# Patient Record
Sex: FEMALE
[De-identification: names, ages, dates, MRNs, and addresses within clinical notes are randomized per-mention and may not be internally consistent; named-entity substitution may affect disease eponyms.]

---

## 2018-01-09 ENCOUNTER — HOSPITAL ENCOUNTER (OUTPATIENT)
Dept: HOSPITAL 92 - BICMAMMO | Age: 43
Discharge: HOME | End: 2018-01-09
Attending: OBSTETRICS & GYNECOLOGY
Payer: COMMERCIAL

## 2018-01-09 DIAGNOSIS — Z12.31: Primary | ICD-10-CM

## 2018-01-09 PROCEDURE — 77067 SCR MAMMO BI INCL CAD: CPT

## 2019-01-06 ENCOUNTER — HOSPITAL ENCOUNTER (OUTPATIENT)
Dept: HOSPITAL 92 - ERS | Age: 44
Setting detail: OBSERVATION
LOS: 2 days | Discharge: HOME | End: 2019-01-08
Attending: SURGERY | Admitting: SURGERY
Payer: COMMERCIAL

## 2019-01-06 VITALS — BODY MASS INDEX: 34.7 KG/M2

## 2019-01-06 DIAGNOSIS — Z90.722: ICD-10-CM

## 2019-01-06 DIAGNOSIS — K81.1: Primary | ICD-10-CM

## 2019-01-06 DIAGNOSIS — Z90.710: ICD-10-CM

## 2019-01-06 LAB
ALBUMIN SERPL BCG-MCNC: 3.9 G/DL (ref 3.5–5)
ALP SERPL-CCNC: 102 U/L (ref 40–150)
ALT SERPL W P-5'-P-CCNC: 37 U/L (ref 8–55)
ANION GAP SERPL CALC-SCNC: 13 MMOL/L (ref 10–20)
AST SERPL-CCNC: 18 U/L (ref 5–34)
BASOPHILS # BLD AUTO: 0.1 THOU/UL (ref 0–0.2)
BASOPHILS NFR BLD AUTO: 0.4 % (ref 0–1)
BILIRUB SERPL-MCNC: 0.6 MG/DL (ref 0.2–1.2)
BUN SERPL-MCNC: 12 MG/DL (ref 7–18.7)
CALCIUM SERPL-MCNC: 9.2 MG/DL (ref 7.8–10.44)
CHLORIDE SERPL-SCNC: 105 MMOL/L (ref 98–107)
CO2 SERPL-SCNC: 25 MMOL/L (ref 22–29)
CREAT CL PREDICTED SERPL C-G-VRATE: 0 ML/MIN (ref 70–130)
CRYSTAL-AUWI FLAG: 0 (ref 0–15)
EOSINOPHIL # BLD AUTO: 0.1 THOU/UL (ref 0–0.7)
EOSINOPHIL NFR BLD AUTO: 0.9 % (ref 0–10)
GLOBULIN SER CALC-MCNC: 3.9 G/DL (ref 2.4–3.5)
GLUCOSE SERPL-MCNC: 105 MG/DL (ref 70–105)
HEV IGM SER QL: 5.9 (ref 0–7.99)
HGB BLD-MCNC: 14.7 G/DL (ref 12–16)
HYALINE CASTS #/AREA URNS LPF: (no result) LPF
LIPASE SERPL-CCNC: 126 U/L (ref 8–78)
LYMPHOCYTES # BLD: 3.6 THOU/UL (ref 1.2–3.4)
LYMPHOCYTES NFR BLD AUTO: 27.2 % (ref 21–51)
MCH RBC QN AUTO: 28.7 PG (ref 27–31)
MCV RBC AUTO: 86.5 FL (ref 78–98)
MONOCYTES # BLD AUTO: 0.9 THOU/UL (ref 0.11–0.59)
MONOCYTES NFR BLD AUTO: 6.9 % (ref 0–10)
NEUTROPHILS # BLD AUTO: 8.7 THOU/UL (ref 1.4–6.5)
NEUTROPHILS NFR BLD AUTO: 64.6 % (ref 42–75)
PATHC CAST-AUWI FLAG: 1.88 (ref 0–2.49)
PLATELET # BLD AUTO: 273 THOU/UL (ref 130–400)
POTASSIUM SERPL-SCNC: 3.6 MMOL/L (ref 3.5–5.1)
PROT UR STRIP.AUTO-MCNC: 30 MG/DL
RBC # BLD AUTO: 5.1 MILL/UL (ref 4.2–5.4)
RBC UR QL AUTO: (no result) HPF (ref 0–3)
SODIUM SERPL-SCNC: 139 MMOL/L (ref 136–145)
SP GR UR STRIP: 1.02 (ref 1–1.04)
SPERM-AUWI FLAG: 0 (ref 0–9.9)
WBC # BLD AUTO: 13.4 THOU/UL (ref 4.8–10.8)
WBC UR QL AUTO: (no result) HPF (ref 0–3)
YEAST-AUWI FLAG: 0 (ref 0–25)

## 2019-01-06 PROCEDURE — 96376 TX/PRO/DX INJ SAME DRUG ADON: CPT

## 2019-01-06 PROCEDURE — 88304 TISSUE EXAM BY PATHOLOGIST: CPT

## 2019-01-06 PROCEDURE — BF10YZZ FLUOROSCOPY OF BILE DUCTS USING OTHER CONTRAST: ICD-10-PCS | Performed by: SURGERY

## 2019-01-06 PROCEDURE — A9537 TC99M MEBROFENIN: HCPCS

## 2019-01-06 PROCEDURE — 96374 THER/PROPH/DIAG INJ IV PUSH: CPT

## 2019-01-06 PROCEDURE — 76705 ECHO EXAM OF ABDOMEN: CPT

## 2019-01-06 PROCEDURE — 81003 URINALYSIS AUTO W/O SCOPE: CPT

## 2019-01-06 PROCEDURE — 96365 THER/PROPH/DIAG IV INF INIT: CPT

## 2019-01-06 PROCEDURE — 83690 ASSAY OF LIPASE: CPT

## 2019-01-06 PROCEDURE — 80076 HEPATIC FUNCTION PANEL: CPT

## 2019-01-06 PROCEDURE — 80048 BASIC METABOLIC PNL TOTAL CA: CPT

## 2019-01-06 PROCEDURE — 0FT44ZZ RESECTION OF GALLBLADDER, PERCUTANEOUS ENDOSCOPIC APPROACH: ICD-10-PCS | Performed by: SURGERY

## 2019-01-06 PROCEDURE — 85025 COMPLETE CBC W/AUTO DIFF WBC: CPT

## 2019-01-06 PROCEDURE — 36415 COLL VENOUS BLD VENIPUNCTURE: CPT

## 2019-01-06 PROCEDURE — 78226 HEPATOBILIARY SYSTEM IMAGING: CPT

## 2019-01-06 PROCEDURE — G0378 HOSPITAL OBSERVATION PER HR: HCPCS

## 2019-01-06 PROCEDURE — S0028 INJECTION, FAMOTIDINE, 20 MG: HCPCS

## 2019-01-06 PROCEDURE — 80053 COMPREHEN METABOLIC PANEL: CPT

## 2019-01-06 PROCEDURE — 96375 TX/PRO/DX INJ NEW DRUG ADDON: CPT

## 2019-01-06 PROCEDURE — 96366 THER/PROPH/DIAG IV INF ADDON: CPT

## 2019-01-06 PROCEDURE — 47532 INJECTION FOR CHOLANGIOGRAM: CPT

## 2019-01-06 PROCEDURE — 81015 MICROSCOPIC EXAM OF URINE: CPT

## 2019-01-06 RX ADMIN — Medication SCH ML: at 20:35

## 2019-01-06 NOTE — ULT
PRELIMINARY REPORT/[facility]/EMERGENTY AFTER-HOURS PROCEDURE 

 

US Abdomen Limited, Right Upper Quadrant

 

EXAM DATE/TIME:

1/6/2019 2:29 AM

 

CLINICAL HISTORY:

43 years old, female; Pain; Other: Ruq pain, HX of gallstones

 

TECHNIQUE:

Real-time ultrasound of the abdomen with image documentation. Examination was focused on the right up
per quadrant.

 

COMPARISON:

No relevant prior studies available.

 

FINDINGS:

Liver: Liver demonstrates fatty infiltration. Hypoechoic 3.5 x 3.2 x 4.6 cm focus noted left lobe whi
ch may reflect area of fatty sparing however mass not excluded.

Gallbladder: Multiple stones with sludge, wall thickening at 3 mm, and focal sonographic tenderness.

Common bile duct: 5 mm. No stones. No dilation.

Pancreas: Obscured.

Right kidney: Normal. No mass. No hydronephrosis.

 

IMPRESSION:

Gallstones with sludge, wall thickening and focal sonographic tenderness. Findings consistent with ch
olecystitis in the appropriate clinical setting.

Fatty liver.

Hypoechoic focus noted left lobe which may reflect area of fatty sparing however mass not excluded.

Nonemergent MRI vs CT liver correlation advised.

 

Thank you for allowing us to participate in the care of your patient.

Dictated and Authenticated by: Catherine Hill MD

01/06/2019 5:06 AM Central Time ([place] & [place])

 

FINAL REPORT

ULTRASOUND GALLBLADDER RIGHT UPPER QUADRANT:

 

History: Pain. 

 

Comparison: None. 

 

FINDINGS: 

The findings and impression are concordant with the preliminary report. Code QA. Liver protocol CT or
 MRI is highly recommended. 

 

POS: [facility]

## 2019-01-06 NOTE — RAD
X-RAY CHOLANGIOGRAM IN SURGERY:

 

HISTORY:

Laparoscopic cholecystectomy with cholangiogram.

 

COMPARISON:

None.

 

FINDINGS:

The cystic duct is cannulated.  Contrast is seen in the cystic duct and the common bile duct, as well
 as the pancreatic duct.  There is small volume contrast within the proximal small bowel.

 

IMPRESSION:

Fluoroscopy for surgical use.

 

POS: HOME

## 2019-01-06 NOTE — HP
HISTORY OF PRESENT ILLNESS:  Ms. Nogueira is a 43-year-old  woman, who was

visiting family in [place].  The patient reports insidious onset of epigastric

abdominal pain, which was postprandial in nature while in [place].  The patient's

pain started 4 days ago, associated with abdominal bloating and flatulence. 



Pain was rated at 10/10. 



The patient also endorsed some nausea and nonbilious emesis. 



Denies any fevers or chills. 



She was seen in [place] by primary physician and received antibiotics and IV fluids

overnight with minimum relief the next day. 



This patient was unable to retain some of the oral medications that was given to her

the next day. 



They decided to return back to [place]. 



En route, the patient had a recurrent epigastric abdominal pain when she took a bite

of subway sandwich. 



She then presented to the emergency department yesterday with a complaint of

worsening abdominal pain. 



She denies any fevers or chills.



PAST MEDICAL HISTORY:  Denies any previous medical problems except for some ovarian

neoplastic process.  She is unsure as to if there were malignancies. 



PAST SURGICAL HISTORY:  Pertinent now for total abdominal hysterectomy with

bilateral salpingo-oophorectomy. 



SOCIAL HISTORY:  The patient is  and lives at home with her family. 



She is a G2, P2. 



She is employed as a  at a high school. 



She denies any cigarette smoking, ethanol, or illicit drug abuse.



FAMILY HISTORY:  Notable for her father with essential hypertension and diabetes

mellitus.  She denies any family history of heart disease or cancer. 



PRE-HOSPITAL MEDICATIONS:  She denies any medications except for over-the-counter

multivitamins. 



ALLERGIES:  THE PATIENT DENIES ANY KNOWN DRUG ALLERGIES.



REVIEW OF SYSTEMS:  Ten-point review of systems essentially unremarkable except as

stated in past medical history and chief complaint. 



PHYSICAL EXAMINATION:

GENERAL:  This reveals a 43-year-old normally-developed woman, who is otherwise

coherent and interactive and appears stated age.  The patient is alert and oriented

x3, appears to be in no acute distress at the time of my evaluation.  She now rates

the pain at 4/10. 

VITAL SIGNS:  This morning, blood pressure 105/67, pulse 77, respiratory rate is 15,

temperature 98.4 degrees Fahrenheit, and oxygen saturation 95% on room air. 

HEENT:  Reveals normocephalic and atraumatic.  Pupils are equal, round, and reactive

to light and accommodation.  Extraocular muscles are intact bilaterally.  She has no

sclerae icterus present.  Oral mucosa pink and moist.  No lesions are noted. 

HEART:  Reveals regular rate and rhythm.  No murmurs or gallops auscultated. 

LUNGS:  Clear to auscultation bilaterally.  Her breathing regular and nonlabored. 

ABDOMEN:  Soft and obese.  She has right upper quadrant tenderness to palpation with

a positive Ramos's sign.  Liver and spleen otherwise nonpalpable below costal

margin. 

EXTREMITIES:  Reveal 2+ radial and pedal pulses bilaterally.  She has no ankle edema

present. 

NEUROLOGIC:  Reveals no focal deficits present.



LABORATORY FINDINGS:  Today includes a CBC with 13,400 white blood cells, hemoglobin

and hematocrit 14.7 and 44.1 respectively, and platelet count is 273,000. 



Metabolic profile; sodium 139, potassium 3.6, chloride is 105, bicarb is 25, BUN 12,

creatinine 0.79, and glucose 105.  Total bilirubin 0.6, AST and ALT normal at 18 and

37 respectively, and alkaline phosphatase is also normal at 102.  Serum lipase is

marginally elevated at 126. 



I have personally reviewed the abdominal ultrasound, which is remarkable for

multiple intraluminal gallstones. 



There is gallbladder wall thickening and pericholecystic fluid present. 



Common bile duct is normal in diameter for this patient's age at 5.1 mm in diameter.



IMPRESSION:  Acute cholecystitis with cholelithiasis.



PLAN:  

1. Laparoscopic cholecystectomy.

2. I advised the patient of the above findings and plan.

3. I also informed the patient of the risks and benefits of the proposed surgery to

include, but not limited to, bleeding, infection, injury to bile duct or surrounding

structures. 

4. This information was provided to the patient through a .  The

patient indicates understanding information given. 

5. She is going to consent for this admission and surgical intervention.







Job ID:  [id number]

## 2019-01-07 LAB
ALBUMIN SERPL BCG-MCNC: 3.3 G/DL (ref 3.5–5)
ALP SERPL-CCNC: 196 U/L (ref 40–150)
ALT SERPL W P-5'-P-CCNC: 153 U/L (ref 8–55)
ANION GAP SERPL CALC-SCNC: 13 MMOL/L (ref 10–20)
AST SERPL-CCNC: 159 U/L (ref 5–34)
BASOPHILS # BLD AUTO: 0 THOU/UL (ref 0–0.2)
BASOPHILS NFR BLD AUTO: 0.2 % (ref 0–1)
BILIRUB DIRECT SERPL-MCNC: 1.7 MG/DL (ref 0.1–0.3)
BILIRUB SERPL-MCNC: 2.2 MG/DL (ref 0.2–1.2)
BUN SERPL-MCNC: 8 MG/DL (ref 7–18.7)
CALCIUM SERPL-MCNC: 8.7 MG/DL (ref 7.8–10.44)
CHLORIDE SERPL-SCNC: 104 MMOL/L (ref 98–107)
CO2 SERPL-SCNC: 25 MMOL/L (ref 22–29)
CREAT CL PREDICTED SERPL C-G-VRATE: 158 ML/MIN (ref 70–130)
EOSINOPHIL # BLD AUTO: 0.1 THOU/UL (ref 0–0.7)
EOSINOPHIL NFR BLD AUTO: 0.7 % (ref 0–10)
GLUCOSE SERPL-MCNC: 114 MG/DL (ref 70–105)
HGB BLD-MCNC: 12.6 G/DL (ref 12–16)
LIPASE SERPL-CCNC: 1790 U/L (ref 8–78)
LYMPHOCYTES # BLD: 2.5 THOU/UL (ref 1.2–3.4)
LYMPHOCYTES NFR BLD AUTO: 23.7 % (ref 21–51)
MCH RBC QN AUTO: 28.8 PG (ref 27–31)
MCV RBC AUTO: 86.5 FL (ref 78–98)
MONOCYTES # BLD AUTO: 0.7 THOU/UL (ref 0.11–0.59)
MONOCYTES NFR BLD AUTO: 6.6 % (ref 0–10)
NEUTROPHILS # BLD AUTO: 7.1 THOU/UL (ref 1.4–6.5)
NEUTROPHILS NFR BLD AUTO: 68.8 % (ref 42–75)
PLATELET # BLD AUTO: 245 THOU/UL (ref 130–400)
POTASSIUM SERPL-SCNC: 3.5 MMOL/L (ref 3.5–5.1)
RBC # BLD AUTO: 4.39 MILL/UL (ref 4.2–5.4)
SODIUM SERPL-SCNC: 138 MMOL/L (ref 136–145)
WBC # BLD AUTO: 10.4 THOU/UL (ref 4.8–10.8)

## 2019-01-07 RX ADMIN — Medication SCH ML: at 09:11

## 2019-01-07 RX ADMIN — Medication SCH ML: at 21:14

## 2019-01-07 NOTE — NM
HEPATOBILIARY SCAN:

 

COMPARISON: 

Cholangiogram in surgery 1/6/2019 and gallbladder ultrasound 1/6/2019.

 

HISTORY: 

Evaluate for common bile duct obstruction.  Elevated bilirubin status post cholecystectomy.

 

TECHNIQUE: 

A hepatobiliary scan was performed after administration of 5.1 mCi of Technetium 99m mebrofenin.

 

FINDINGS:  

Prompt uptake of the radiopharmaceutical by the liver is seen.  the gallbladder is not seen as it has
 been removed.  Contrast is seen within the bowel by 10 minutes.

 

IMPRESSION: 

No evidence of common bile duct obstruction as the radiopharmaceutical passes into the bowel.

 

POS: [facility]

## 2019-01-07 NOTE — OP
DATE OF PROCEDURE:  01/06/2019



PREOPERATIVE DIAGNOSIS:  Acute cholecystitis with cholelithiasis.



POSTOPERATIVE DIAGNOSES:  Acute cholecystitis, cholelithiasis, and

choledocholithiasis. 



OPERATION PERFORMED:  Laparoscopic cholecystectomy with intraoperative cholangiogram.



ANESTHESIA:  General endotracheal.



ESTIMATED BLOOD LOSS:  25 mL.



FLUIDS GIVEN:  1500 mL crystalloids.



COUNTS:  Sponge and instrument count was verified as correct x2.



COMPLICATIONS:  None apparent at the time of operation.



INDICATIONS FOR OPERATION:  A 43-year-old  woman, who presented with

recurrent epigastric right upper quadrant abdominal pain. 



Clinical radiographic examination was consistent with acute cholecystitis with

cholelithiasis for which the patient was brought to the operative room for

cholecystectomy. 



Intraoperative cholangiogram was also performed as the patient was noted with mildly

elevated lipase. 



The common bile duct was also upper limits of normal for patient's age. 



Findings are consistent with gallbladder in the usual anatomic location, completely

encased by omental adhesions. 



Cholangiography also reveals distal common bile duct with filling defects suggestive

of choledocholithiasis. 



DESCRIPTION OF PROCEDURE:  Informed consent was obtained from the patient, who was

brought to the operating room and placed in supine position. 



Following general anesthesia, abdomen was sterilely prepped and draped in the usual

fashion.  The skin below the umbilicus was infiltrated with 0.25% Marcaine with

epinephrine. 



A small curvilinear infraumbilical incision was made using 11 scalpel. 



The umbilical stalk was grasped with a Kocher and elevated. 



Veress needle was inserted through incision first in the peritoneal cavity through

which the abdomen was insufflated with 3 L of CO2 gas. 



The intraabdominal pressure was noted at 2 mmHg present for abdomen insufflation.

The Veress needle was removed and a 5 mm trocar was introduced using a Visiport

under laparoscopy. 



Laparoscopy confirmed proper placement of the port with no injury to the underlying

structures. 



Additional laparoscopy revealed gallbladder in the usual anatomic location,

completely encased by omental adhesions. 



Under direct laparoscopy, a 12 mm epigastric and two 5 mm right lateral subcostal

ports were placed.  The overlying skin was infiltrated with 0.25% Marcaine with

epinephrine and appropriate incision was made. 



The patient was placed in a reverse Trendelenburg position and rotated to the left. 



I introduced a Dayna dissector with cautery using this to take down omental

adhesions to expose the fundus of the gallbladder. 



A Prestige grasper was introduced through the right lateral subcostal port, grasping

the fundus of gallbladder, which was elevated cephalad. 



The omental adhesions were then taken down from the remainder of the gallbladder

using a Dayna dissector with cautery.  Good hemostasis was noted in place. 



Anterior coursing cystic artery was dissected free from surrounding structures and

divided between clips, applying two clips proximally and one clip at the junction of

the cystic artery and gallbladder. 



The cystic duct was also carefully dissected free from surrounding structures. 



I placed a securing clip at the junction of the cystic duct and gallbladder. 



Cystotomy was made proximal to securing clip using Endo scissors. 



To introduce the cholangiocatheter in the right upper quadrant, we flushed first

with saline and the catheter was introduced into the cystic duct for lumen, securing

this with a single clip. 



It was flushed with difficulty with saline and then on direct fluoroscopy

cholangiogram was completed using a total of 23 mL of Conray contrast. 



Total fluoroscopy time was 56 seconds. 



There was a filling defect in the distal aspect of the common bile duct. 



I gave a milligram of glucagon and repeated the cholangiogram, still persistent

distal common bile duct.  No filling defect was noted. 



Cholangiography was terminated at this juncture. 



Securing clip was removed and the cystic duct was divided between clips, applying

two clips proximally. 



The gallbladder itself was removed from the liver bed using cautery with good

hemostasis. 



Gallbladder was delivered out of the abdominal cavity using the EndoCatch. 



Operative site was irrigated with saline. 



Finding no other pathology, laparoscopy was terminated.  The fascia of the

epigastric port was closed using 0 Vicryl suture and endo-closure on the

laparoscopy. 



Abdomen was desufflated. 



All ports and instruments were removed and accounted for. 



Skin incision was closed using 4-0 Monocryl suture in subcuticular fashion. 



Dermabond was applied over incisional closure. 



The patient tolerated the operation without any apparent complication and was

returned to recovery room in satisfactory condition. 







Job ID:  [id number]

## 2019-01-07 NOTE — PRG
DATE OF SERVICE:  01/07/2019



SUBJECTIVE:  The patient is postop day #1 cholecystectomy.  The patient had no

overnight event.  She is n.p.o. at this time.  GI was consulted and a HIDA scan was

ordered. 



OBJECTIVE:  VITAL SIGNS:  Temperature 98.4, pulse 73, respirations 16, 93% on room

air SpO2, 130/80 blood pressure. 

GENERAL:  The patient is awake, alert, in no distress, lying in bed. 

HEENT:  Normocephalic and atraumatic.  Mucous membranes moist. 

RESPIRATORY:  Rate regular, unlabored.  No distress. 

CARDIOVASCULAR:  No pedal edema.  Regular rhythm. 

MUSCULOSKELETAL:  Moves all extremities.  Positive distal pulses.



ASSESSMENT AND PLAN:  Postop day 1 of cholecystectomy.  Pending GI consult and HIDA

scan, possible endoscopic retrograde cholangiopancreatography.  We will repeat LFTs

in the morning.  The patient has been discussed with the attending surgeon. 







Job ID:  [id number]

## 2019-01-07 NOTE — CON
DATE OF CONSULTATION:  



HISTORY OF PRESENT ILLNESS:  The patient is a 43-year-old  female, who came

into the hospital yesterday with 4 days of right upper quadrant pain.  She

subsequently underwent a cholecystectomy and intraoperative cholangiogram.  The

intraoperative cholangiogram showed some sparse bowel filling, but no clear

obstruction.  I reviewed the cholangiogram with the radiologist and it was unclear

whether there was a distinct gallstone in the common bile duct.  Presently, the

patient is very hungry.  She is having no abdominal pain.  She is sore from her

surgery, but otherwise doing well. 



PAST MEDICAL HISTORY:  Essentially negative.



PAST SURGICAL HISTORY:  Includes hysterectomy.



SOCIAL HISTORY:  She does not smoke or drink.



FAMILY HISTORY:  Negative for GI or liver disease.



REVIEW OF SYSTEMS:  CONSTITUTIONAL:  No fever or chills.  No weight loss. 

EYES:  No blurred vision or double vision. 

ENT:  No sore throat or earaches. 

CARDIOVASCULAR:  No chest pain or palpitation. 

PULMONARY:  No shortness of breath, cough, or wheezing. 

GI:  See above. 

:  No hematuria or dysuria. 

MUSCULOSKELETAL:  No joint pain or muscle weakness. 

SKIN:  No rashes. 

NEUROLOGIC:  No numbness or seizure activity.



PHYSICAL EXAMINATION:

GENERAL:  Shows overweight  female, in no acute distress. 

VITAL SIGNS:  Temperature 98.2, pulse 79, respiratory rate 16, and blood pressure

132/83. 

HEENT:  Unremarkable. 

NECK:  Supple. 

CHEST:  Clear. 

CARDIOVASCULAR:  Regular rate and rhythm. 

ABDOMEN:  Soft, slightly tender in the right upper quadrant without rebound or

guarding.  Bowel sounds are present normoactive. 



LABORATORY DATA:  Laboratories shows essentially normal CBC.  Chemistries were

normal on admission with a lipase of 126.  Today's labs showed a total bilirubin

2.2, direct is 1.7, , , alkaline phosphatase of 196, and lipase is

1790.  HIDA scan showed no evidence of common bile duct obstruction. 



ASSESSMENT:  Abnormal liver function test, status post laparoscopic cholecystectomy

- it is unclear in this situation whether the patient has a common duct stone.  The

common bile duct is very small.  There are no clear filling defects.  She does have

some slight elevation of her lipase, either from the cholangiogram or possibly a

small stone.  Presently, she is feeling well without pain or decreased appetite. 



RECOMMENDATIONS:  Repeat LFTs in a.m.  If elevated, then would proceed with ERCP.

If improving, would feed the patient and discharge her if she tolerates her diet. 







Job ID:  [id number]

## 2019-01-08 VITALS — TEMPERATURE: 98.4 F

## 2019-01-08 VITALS — DIASTOLIC BLOOD PRESSURE: 70 MMHG | SYSTOLIC BLOOD PRESSURE: 109 MMHG

## 2019-01-08 LAB
ALBUMIN SERPL BCG-MCNC: 3.3 G/DL (ref 3.5–5)
ALP SERPL-CCNC: 180 U/L (ref 40–150)
ALT SERPL W P-5'-P-CCNC: 126 U/L (ref 8–55)
AST SERPL-CCNC: 81 U/L (ref 5–34)
BILIRUB DIRECT SERPL-MCNC: 0.4 MG/DL (ref 0.1–0.3)
BILIRUB SERPL-MCNC: 0.7 MG/DL (ref 0.2–1.2)
LIPASE SERPL-CCNC: 116 U/L (ref 8–78)

## 2019-01-08 RX ADMIN — Medication SCH ML: at 07:35

## 2019-01-09 NOTE — DIS
DATE OF ADMISSION:  01/06/2019



DATE OF DISCHARGE:  01/08/2019



ADMITTING PHYSICIAN:  Rohan Moore DO



DISCHARGING PHYSICIAN:  Rohan Moore DO.



ADMITTING DIAGNOSIS:  Acute cholecystitis with cholelithiasis.



POSTOPERATIVE DIAGNOSES:  

1. Acute cholecystitis with cholelithiasis.

2. Choledocholithiasis, resolved.

3. Resolved acute pancreatitis.



CONSULTANTS:  Dr. Tej Polk with Gastroenterology.



OPERATIONS AND PROCEDURES:  Laparoscopic cholecystectomy with intraoperative

cholangiogram on 01/06/2018, by Dr. Moore.  Please see a separate dictation for

operative report. 



HISTORY AND HOSPITAL COURSE:  A 43-year-old woman, presented with recurrent

epigastric right upper quadrant abdominal pain. 



Clinical and radiographic examination were consistent with acute cholecystitis with

cholelithiasis for which the patient was brought to the operating room for

laparoscopic cholecystectomy. 



Given marginally elevated lipases, decision was made to perform intraoperative

cholangiogram. 



Cholangiogram revealed filling defects involving the distal common bile duct. 



Choledocholithiasis was suspected for which Gastroenterology was consulted. 



Postoperative day #1, the patient was evaluated, although her pain is markedly

improved.  Her LFTs and serum lipase were markedly elevated. 



The patient was placed on a clear liquid diet. 



HIDA scan was obtained which did not reveal any evidence of common bile ductal

obstruction. 



Postoperative day #2, the patient is re-evaluated at this time.  She reports no

abdominal pain.  She is tolerating diet, having normal bowel and urinary function. 



LFTs and serum lipase done, almost normalized. 



She has remained hemodynamically stable and afebrile throughout this

hospitalization. 



On clinical examination, abdomen is soft with mild incisional tenderness to

palpation. 



I suspect that the patient may have passed a stone and pancreatitis has clinically

resolved. 



Diet was advanced and the patient has been discharged today with the following

instructions; 

1. She follows up with me in the Surgery Clinic in 2 weeks.

2. She is instructed to avoid weight lifting in excess of 20 pounds __________ has

been released by me. 

3. She may shower, but avoid soaking herself in a bathtub or swim until I have seen

her in the next 2 weeks. 

4. She is instructed to resume all of her pre-hospital medications as prescribed by

her primary care physician. 

5. Additionally, she may take Tylenol 1000 mg p.o. q.6 hours alternating this with

ibuprofen 800 mg p.o. q.8 hours p.r.n. pain. 

6. Additionally, she is given a prescription for tramadol 50 mg, #30, to be taken 1

to 2 p.o. q.6 hours p.r.n. breakthrough pain. 

7. The patient is instructed to call me with any questions or problems including

exacerbation of abdominal pain, fever in excess of 101 degrees Fahrenheit or

intolerance to oral intake. 

Information given to the patient in the presence of her daughter who provided

[place] interpretation. 



The patient has indicated understanding of information given. 



She has expressed gratitude for the care and __________ during this hospitalization

and surgery. 







Job ID:  [id number]

## 2019-09-26 ENCOUNTER — HOSPITAL ENCOUNTER (EMERGENCY)
Dept: HOSPITAL 9 - MADERS | Age: 44
Discharge: HOME | End: 2019-09-26
Payer: COMMERCIAL

## 2019-09-26 DIAGNOSIS — S92.141A: Primary | ICD-10-CM
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
## 2019-09-26 PROCEDURE — 29515 APPLICATION SHORT LEG SPLINT: CPT

## 2019-09-26 NOTE — RAD
THREE VIEWS RIGHT ANKLE:

 

COMPARISON: 

None.

 

HISTORY: 

Right ankle pain after twisting it earlier this morning.

 

FINDINGS: 

Three views of the right ankle show a small avulsion fracture off the lateral aspect of the talus.  N
o tibial fracture or fibula fracture is seen.  Moderate lateral soft tissue swelling is seen.  

 

IMPRESSION: 

Small avulsion fracture off the lateral talus.

 

POS: [facility]

## 2020-07-07 ENCOUNTER — HOSPITAL ENCOUNTER (OUTPATIENT)
Dept: HOSPITAL 92 - BICMAMMO | Age: 45
Discharge: HOME | End: 2020-07-07
Attending: OBSTETRICS & GYNECOLOGY
Payer: COMMERCIAL

## 2020-07-07 DIAGNOSIS — N64.4: Primary | ICD-10-CM

## 2020-07-07 PROCEDURE — G0279 TOMOSYNTHESIS, MAMMO: HCPCS

## 2020-07-07 PROCEDURE — 77066 DX MAMMO INCL CAD BI: CPT

## 2020-07-07 NOTE — MMO
Bilateral MAMMO Bilat Diag [facility].

 

CLINICAL HISTORY:

Patient is 44 years old and is seen for diagnostic exam and pain in the left

breast. The patient has no family history of breast cancer.  The patient has no

personal history of cancer.

 

VIEWS:

The views performed were:  bilateral craniocaudal with tomosynthesis; bilateral

mediolateral oblique with tomosynthesis; bilateral mediolateral with

tomosynthesis; and  cleavage view.

 

FILMS COMPARED:

The present examination has been compared to prior imaging studies performed at

[facility] on 03/11/2019, and at [facility] on 05/06/2013,

03/24/2016 and 01/09/2018.

 

This study has been interpreted with the assistance of computer-aided detection.

 

MAMMOGRAM FINDINGS:

There are scattered fibroglandular densities.

 

There are no suspicious masses, suspicious calcifications, or new areas of

architectural distortion.

 

There are no mammographic abnormalities to explain the patient's breast pain.

The patient is referred back to her clinician.  Negative imaging findings should

not preclude biopsy if clinical findings are suspicious.

 

IMPRESSION:

THERE ARE NO MAMMOGRAPHIC ABNORMALITIES TO EXPLAIN THE PATIENT'S BREAST PAIN.

THE PATIENT IS REFERRED BACK TO HER CLINICIAN. NEGATIVE IMAGING FINDINGS SHOULD

NOT PRECLUDE BIOPSY IF CLINICAL FINDINGS ARE SUSPICIOUS.

 

THE RESULTS OF THIS EXAM WERE SENT TO THE PATIENT.

 

ACR BI-RADS Category 1 - Negative

 

MAMMOGRAPHY NOTE:

 1. A negative mammogram report should not delay a biopsy if a dominant of

 clinically suspicious mass is present.

 2. Approximately 10% to 15% of breast cancers are not detected by

 mammography.

 3. Adenosis and dense breasts may obscure an underlying neoplasm.

 

 

Reported by: JACOB MORRIS MD

Electonically Signed: [id number]